# Patient Record
Sex: MALE | URBAN - METROPOLITAN AREA
[De-identification: names, ages, dates, MRNs, and addresses within clinical notes are randomized per-mention and may not be internally consistent; named-entity substitution may affect disease eponyms.]

---

## 2021-08-16 ENCOUNTER — OFFICE VISIT (OUTPATIENT)
Dept: URBAN - METROPOLITAN AREA CLINIC 11 | Facility: CLINIC | Age: 66
End: 2021-08-16
Payer: COMMERCIAL

## 2021-08-16 DIAGNOSIS — H40.033 ANATOMICAL NARROW ANGLE, BILATERAL: Primary | ICD-10-CM

## 2021-08-16 PROCEDURE — 92250 FUNDUS PHOTOGRAPHY W/I&R: CPT | Performed by: OPTOMETRIST

## 2021-08-16 PROCEDURE — 99204 OFFICE O/P NEW MOD 45 MIN: CPT | Performed by: OPTOMETRIST

## 2021-08-16 RX ORDER — LATANOPROST 50 UG/ML
0.005 % SOLUTION OPHTHALMIC
Qty: 2.5 | Refills: 9 | Status: ACTIVE
Start: 2021-08-16

## 2021-08-16 ASSESSMENT — KERATOMETRY
OS: 43.88
OD: 43.88

## 2021-08-16 ASSESSMENT — INTRAOCULAR PRESSURE
OD: 32
OD: 35
OS: 37
OS: 31

## 2021-08-16 NOTE — IMPRESSION/PLAN
Impression: Anatomical narrow angle, bilateral: H40.033. Plan: Photos Jonnie Inman 74 ordered and performed today OU. IOP elevated today, start Rx Latanoprost QHS OU. Angles appear occludable OU.  Refer to MD for PI eval. s/s of angle closure call office or go to E